# Patient Record
Sex: MALE | Race: OTHER
[De-identification: names, ages, dates, MRNs, and addresses within clinical notes are randomized per-mention and may not be internally consistent; named-entity substitution may affect disease eponyms.]

---

## 2018-05-23 ENCOUNTER — HOSPITAL ENCOUNTER (OUTPATIENT)
Dept: HOSPITAL 61 - US | Age: 68
Discharge: HOME | End: 2018-05-23
Attending: FAMILY MEDICINE
Payer: MEDICARE

## 2018-05-23 DIAGNOSIS — I73.9: Primary | ICD-10-CM

## 2018-05-23 PROCEDURE — 93922 UPR/L XTREMITY ART 2 LEVELS: CPT

## 2019-06-19 ENCOUNTER — HOSPITAL ENCOUNTER (OUTPATIENT)
Dept: HOSPITAL 61 - US | Age: 69
Discharge: HOME | End: 2019-06-19
Attending: FAMILY MEDICINE
Payer: MEDICARE

## 2019-06-19 DIAGNOSIS — L97.329: ICD-10-CM

## 2019-06-19 DIAGNOSIS — I70.292: Primary | ICD-10-CM

## 2019-06-19 DIAGNOSIS — M79.662: ICD-10-CM

## 2019-06-19 PROCEDURE — 93922 UPR/L XTREMITY ART 2 LEVELS: CPT

## 2019-06-19 PROCEDURE — 93926 LOWER EXTREMITY STUDY: CPT

## 2019-06-19 PROCEDURE — 93971 EXTREMITY STUDY: CPT

## 2019-06-19 NOTE — RAD
Left lower extremity venous insufficiency ultrasound exam, 6/19/2019:

 

HISTORY: Nonhealing wound at left ankle

 

Duplex evaluation of the greater saphenous and lesser saphenous veins was 

performed including grayscale, color-flow and spectral Doppler analysis.

 

The left greater saphenous vein in the thigh is patent and does not 

demonstrate significant reflux. The left lesser saphenous vein is also 

patent without significant reflux. Two small perforating veins were noted 

in the mid and upper left calf.

 

IMPRESSION: No evidence of significant reflux in the greater saphenous or 

lesser saphenous veins.

 

Electronically signed by: Rick Moritz, MD (6/19/2019 8:22 AM) [facility]

## 2019-06-19 NOTE — RAD
Left lower extremity arterial ultrasound; left ankle brachial index

 

History: Nonhealing wound left ankle

 

Findings: Multiple grayscale, color, and duplex spectral analysis 

sonographic images were acquired of the left lower extremity arteries. 

There are mostly triphasic waveforms other than biphasic waveforms of the 

left peroneal artery and anterior tibial artery. No vessel occlusion or 

significant focal stenosis is demonstrated. There is scattered plaque.

 

There is a left popliteal fossa fluid collection about 5.3 x 1.8 x 2.9 cm 

in size.

 

Velocities in cm/sec:

 

Common femoral artery   117

Profunda femoris artery   79

Proximal SFA     136

Mid SFA       105

Distal SFA       115

Popliteal artery     99

Posterior tibial artery   109 proximally and 106 distally

Peroneal artery             62

Anterior tibial artery   90

Dorsalis pedis artery   118 

 

 

Impression:

1. No vessel occlusion or significant focal stenosis is demonstrated, 

demonstrable flow of the runoff vessels.

2. There is left popliteal fossa fluid collection.

 

Left ankle brachial index:

 

Left SHAHIDA was 1.35.

 

IMPRESSION:

 

1. Left SHAHIDA was within normal limits.

 

Electronically signed by: Ren Kenny MD (6/19/2019 8:56 AM) 

[facility]

## 2019-06-19 NOTE — RAD
Left lower extremity arterial ultrasound; left ankle brachial index

 

History: Nonhealing wound left ankle

 

Findings: Multiple grayscale, color, and duplex spectral analysis 

sonographic images were acquired of the left lower extremity arteries. 

There are mostly triphasic waveforms other than biphasic waveforms of the 

left peroneal artery and anterior tibial artery. No vessel occlusion or 

significant focal stenosis is demonstrated. There is scattered plaque.

 

There is a left popliteal fossa fluid collection about 5.3 x 1.8 x 2.9 cm 

in size.

 

Velocities in cm/sec:

 

Common femoral artery   117

Profunda femoris artery   79

Proximal SFA     136

Mid SFA       105

Distal SFA       115

Popliteal artery     99

Posterior tibial artery   109 proximally and 106 distally

Peroneal artery             62

Anterior tibial artery   90

Dorsalis pedis artery   118 

 

 

Impression:

1. No vessel occlusion or significant focal stenosis is demonstrated, 

demonstrable flow of the runoff vessels.

2. There is left popliteal fossa fluid collection.

 

Left ankle brachial index:

 

Left SHAHIDA was 1.35.

 

IMPRESSION:

 

1. Left SHAHIDA was within normal limits.

 

Electronically signed by: Ren Kenny MD (6/19/2019 8:56 AM) 

[facility]

## 2020-07-14 ENCOUNTER — HOSPITAL ENCOUNTER (OUTPATIENT)
Dept: HOSPITAL 61 - US | Age: 70
Discharge: HOME | End: 2020-07-14
Attending: FAMILY MEDICINE
Payer: MEDICARE

## 2020-07-14 DIAGNOSIS — N18.9: ICD-10-CM

## 2020-07-14 DIAGNOSIS — K76.0: Primary | ICD-10-CM

## 2020-07-14 PROCEDURE — 76700 US EXAM ABDOM COMPLETE: CPT

## 2020-07-14 NOTE — RAD
Examination: ABDOMEN COMPLETE

 

History: Reason: CKD / Spl. Instructions:  / History: 

 

Comparison/Correlation: None

 

Findings: Right kidney measures 10.2 cm x 5.3 cm x 5.1. Left kidney 

measures 10.2 cm x 4.6 x 6 mm. No hydronephrosis. Renal contours and 

echotexture are normal. No definite nephrolithiasis.

 

Pancreas is obscured by bowel gas. Inferior vena cava is obscured by bowel

gas. Abdominal aorta measures up to 1.7 cm diameter. Abdominal aorta is 

mostly obscured by bowel gas.

 

Fatty infiltration of the liver noted.

 

Impression:

No hydronephrosis.

 

Electronically signed by: Waqas Dee MD (7/14/2020 8:14 AM) [id number]